# Patient Record
(demographics unavailable — no encounter records)

---

## 2025-03-03 NOTE — ASSESSMENT
[FreeTextEntry1] : 36 yo female with class 3 obesity and infertility interested in a sleeve gastrectomy for weight loss

## 2025-03-03 NOTE — PHYSICAL EXAM
[Normal] : affect appropriate [de-identified] : no distress [de-identified] : nonlabored breathing [de-identified] : s1,s2 [de-identified] : soft, nontender

## 2025-03-03 NOTE — PLAN
[FreeTextEntry1] : We discussed in detail how Bariatric Surgery is a tool to help treat the serious disease of obesity, and that Her compliance with diet, exercise, and follow-up is crucial to Her overall safety and success.  She will require the following pre-operative evaluations and testing:       Lab work Psychological evaluation Diet history Pulmonology evaluation, including sleep study Gastroenterology evaluation for upper endoscopy PMD clearance Nutritional evaluation Attendance at preoperative support groups       We had a discussion about potential post-operative complications, including but not limited to: bleeding, infection, leak, stricture, blood clots, GERD, problems with anesthesia.  The patient understands and wishes to proceed.       We discussed that smoking of any kind will preclude the patient from being able to have bariatric surgery.  Explained to patient that recommendations are to wait at least 1 year after surgery before trying to get pregnant.

## 2025-03-03 NOTE — END OF VISIT
[Time Spent: ___ minutes] : I have spent [unfilled] minutes of time on the encounter which excludes teaching and separately reported services.
no fx

## 2025-03-03 NOTE — HISTORY OF PRESENT ILLNESS
[de-identified] : Ms. WENDY VASQUEZ is a pleasant 35 year old female who has been struggling with Her weight for many years.    Ms. WENDY VASQUEZ has tried countless diet and exercise programs, but has been unable to lose sufficient weight and keep it off.  She is here today to discuss surgical options for weight loss. She is here with her  who is keen on her having the procedure especially to improve her fertility.   Their medical history includes mod risk for JOSSE, but not diagnosed. They  deny GERD. They have never had an endoscopy.  Their surgical history includes a csection.  They are able to easily walk up two flights of stairs without SOB and their STOPBANG score is 3.   . They do not smoke. 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 Their support system includes her